# Patient Record
Sex: MALE | Race: BLACK OR AFRICAN AMERICAN | URBAN - METROPOLITAN AREA
[De-identification: names, ages, dates, MRNs, and addresses within clinical notes are randomized per-mention and may not be internally consistent; named-entity substitution may affect disease eponyms.]

---

## 2017-01-03 ENCOUNTER — TELEPHONE (OUTPATIENT)
Dept: ORTHOPEDICS | Facility: CLINIC | Age: 62
End: 2017-01-03

## 2017-01-03 NOTE — TELEPHONE ENCOUNTER
Patient calling for return to work clarification. He states he is ready to go back to work 1/9/17. Letter written and patient notified.

## 2017-01-03 NOTE — TELEPHONE ENCOUNTER
----- Message from Cindy Vogt sent at 1/3/2017 11:10 AM CST -----  Regarding: Pt needs clearance to return to work from Dr. Watts  Contact: [phone]  Pt calling about clarification on when he can return to work.  Pt was informed by his work that he will need clearance from Dr. Watts before he can return to work.  Please call the pt back with the number listed above.    Thank you,  Cindy PEÑA  Call Center    Please DO NOT send this message and/or reply back to sender.  Call Center Representatives DO NOT respond to messages.

## 2017-01-03 NOTE — Clinical Note
Return to Work  January 3, 2017     Seen today: no    Patient:  Justus Guido  :   1955  MRN:     [id number]  Physician: KIERAN ANGELO SHYANN Guido may return to work on Date: 17.      The next clinic appointment is scheduled for (date/time) 17.    Patient limitations:  Seated work only until re-evaluated in clinic 17           Electronically signed by Kieran Angelo MD

## 2017-01-17 ENCOUNTER — OFFICE VISIT (OUTPATIENT)
Dept: ORTHOPEDICS | Facility: CLINIC | Age: 62
End: 2017-01-17

## 2017-01-17 VITALS — HEIGHT: 73 IN | BODY MASS INDEX: 29.55 KG/M2 | WEIGHT: 223 LBS

## 2017-01-17 DIAGNOSIS — M25.571 PAIN IN JOINT, ANKLE AND FOOT, RIGHT: Primary | ICD-10-CM

## 2017-01-17 NOTE — NURSING NOTE
"Reason For Visit:   Chief Complaint   Patient presents with     Surgical Followup     S/P R Achilles lengthening and ST and TN fusion. DOS 12/2/16.       Pain Assessment  Patient Currently in Pain: No               HEIGHT: 6' 1\", WEIGHT: 223 lbs 0 oz, BMI: Body mass index is 29.43 kg/(m^2).      Current Outpatient Prescriptions   Medication Sig Dispense Refill     Multiple Vitamins-Minerals (MULTIVITAMIN ADULT PO)        Ferrous Sulfate (IRON SUPPLEMENT PO)           No Known Allergies        "

## 2017-01-17 NOTE — MR AVS SNAPSHOT
After Visit Summary   2017    Justus Guido    MRN: [id number]           Patient Information     Date Of Birth          1955        Visit Information        Provider Department      2017 1:00 PM Kieran Watts MD [facility]        Today's Diagnoses     Pain in joint, ankle and foot, right    -  1        Follow-ups after your visit        Your next 10 appointments already scheduled     2017  2:00 PM   (Arrive by 1:45 PM)   RETURN FOOT/ANKLE with Kieran Watts MD   [facility] ([facility] [place])    [address] [place] [id number] [phone]              Who to contact     Please call your clinic at [phone] to:    Ask questions about your health    Make or cancel appointments    Discuss your medicines    Learn about your test results    Speak to your doctor   If you have compliments or concerns about an experience at your clinic, or if you wish to file a complaint, please contact [facility] Patient Relations at [phone] or email us at [email or web address]         Additional Information About Your Visit        MyChart Information     Amakemt is an electronic gateway that provides easy, online access to your medical records. With Derceto, you can request a clinic appointment, read your test results, renew a prescription or communicate with your care team.     To sign up for Amakemt visit the website at [email or web address]   You will be asked to enter the access code listed below, as well as some personal information. Please follow the directions to create your username and password.     Your access code is: [email or web address]  Expires: 2017  6:31 AM     Your access code will  in 90 days. If you need help or a new code, please contact your [facility] or call [phone] for assistance.       "  Care EveryWhere ID     This is your Care EveryWhere ID. This could be used by other organizations to access your [place] medical records  [id number]        Your Vitals Were     Height BMI (Body Mass Index)                1.854 m (6' 1\") 29.43 kg/m2           Blood Pressure from Last 3 Encounters:   11/23/16 133/87    Weight from Last 3 Encounters:   01/17/17 101.152 kg (223 lb)   11/23/16 101.3 kg (223 lb 5.2 oz)   08/09/16 100.245 kg (221 lb)              Today, you had the following     No orders found for display       Primary Care Provider    Eden Santos RN       No address on file        Thank you!     Thank you for choosing [facility]  for your care. Our goal is always to provide you with excellent care. Hearing back from our patients is one way we can continue to improve our services. Please take a few minutes to complete the written survey that you may receive in the mail after your visit with us. Thank you!             Your Updated Medication List - Protect others around you: Learn how to safely use, store and throw away your medicines at www.disposemymeds.org.          This list is accurate as of: 1/17/17  2:16 PM.  Always use your most recent med list.                   Brand Name Dispense Instructions for use    IRON SUPPLEMENT PO          MULTIVITAMIN ADULT PO            "

## 2017-01-17 NOTE — Clinical Note
1/17/2017       RE: Justus Guido  [address]     [place] [address]     Dear Colleague,    Thank you for referring your patient, Justus Guido, to the [facility] at [facility]. Please see a copy of my visit note below.    CHIEF COMPLAINT:  Status post right Achilles tendon lengthening with subtalar and talonavicular joint arthrodesis performed on 12/02/2016.      HISTORY OF PRESENT ILLNESS:  Mr. Guido presents today for further followup.  Denies to have any problems or complaints.  Reports to be compliant with our activity restrictions, although reports to have fallen approximately 3 weeks ago.      PHYSICAL EXAMINATION:  On today's visit, he presents with well-healed surgical incisions.  Alignment of the foot is excellent.  There is some diffuse swelling.      RADIOGRAPHIC EVALUATION:  Three views of the right foot were obtained today which were significant for showing partial consolidation across the talonavicular and subtalar joints.  Alignment is excellent.  Hardware is intact and in place.      ASSESSMENT:  Status post right subtalar and talonavicular joint arthrodesis.      PLAN:  I discussed with the patient that he is making excellent progress.  The patient will proceed with nonweightbearing for another 6 weeks.  He will return to clinic at that time and 3 views of the right foot will be obtained.      On today's visit, he was given a CAM Walker to be utilized at all times except for hygiene, resting, sitting and sleeping activities.      No physical therapy will be started yet.   Kieran Watts MD

## 2017-01-17 NOTE — PROGRESS NOTES
CHIEF COMPLAINT:  Status post right Achilles tendon lengthening with subtalar and talonavicular joint arthrodesis performed on 12/02/2016.      HISTORY OF PRESENT ILLNESS:  Mr. Guiod presents today for further followup.  Denies to have any problems or complaints.  Reports to be compliant with our activity restrictions, although reports to have fallen approximately 3 weeks ago.      PHYSICAL EXAMINATION:  On today's visit, he presents with well-healed surgical incisions.  Alignment of the foot is excellent.  There is some diffuse swelling.      RADIOGRAPHIC EVALUATION:  Three views of the right foot were obtained today which were significant for showing partial consolidation across the talonavicular and subtalar joints.  Alignment is excellent.  Hardware is intact and in place.      ASSESSMENT:  Status post right subtalar and talonavicular joint arthrodesis.      PLAN:  I discussed with the patient that he is making excellent progress.  The patient will proceed with nonweightbearing for another 6 weeks.  He will return to clinic at that time and 3 views of the right foot will be obtained.      On today's visit, he was given a CAM Walker to be utilized at all times except for hygiene, resting, sitting and sleeping activities.      No physical therapy will be started yet.

## 2017-02-14 ENCOUNTER — MEDICAL CORRESPONDENCE (OUTPATIENT)
Dept: HEALTH INFORMATION MANAGEMENT | Facility: CLINIC | Age: 62
End: 2017-02-14

## 2017-02-20 DIAGNOSIS — M25.571 PAIN IN JOINT, ANKLE AND FOOT, RIGHT: Primary | ICD-10-CM

## 2017-02-28 ENCOUNTER — OFFICE VISIT (OUTPATIENT)
Dept: ORTHOPEDICS | Facility: CLINIC | Age: 62
End: 2017-02-28

## 2017-02-28 DIAGNOSIS — M25.571 PAIN IN JOINT, ANKLE AND FOOT, RIGHT: Primary | ICD-10-CM

## 2017-02-28 NOTE — NURSING NOTE
Reason For Visit:   Chief Complaint   Patient presents with     RECHECK     right Achilles tendon lengthening with subtalar and talonavicular joint arthrodesis. DOS 12/2/16.           Pain Assessment  Patient Currently in Pain: No

## 2017-02-28 NOTE — MR AVS SNAPSHOT
After Visit Summary   2017    Justus Guido    MRN: [id number]           Patient Information     Date Of Birth          1955        Visit Information        Provider Department      2017 12:50 PM Kieran Watts MD [facility]        Today's Diagnoses     Pain in joint, ankle and foot, right    -  1       Follow-ups after your visit        Your next 10 appointments already scheduled     Mar 28, 2017  2:00 PM CDT   (Arrive by 1:45 PM)   RETURN FOOT/ANKLE with Kieran Watts MD   [facility] ([facility] [place])    [address] [place] [id number] [phone]              Who to contact     Please call your clinic at [phone] to:    Ask questions about your health    Make or cancel appointments    Discuss your medicines    Learn about your test results    Speak to your doctor   If you have compliments or concerns about an experience at your clinic, or if you wish to file a complaint, please contact [facility] Patient Relations at [phone] or email us at [email or web address]         Additional Information About Your Visit        MyChart Information     Rock'n Rovert is an electronic gateway that provides easy, online access to your medical records. With "[employer] you can request a clinic appointment, read your test results, renew a prescription or communicate with your care team.     To sign up for Rock'n Rovert visit the website at [email or web address]   You will be asked to enter the access code listed below, as well as some personal information. Please follow the directions to create your username and password.     Your access code is: [email or web address]  Expires: 2017  6:31 AM     Your access code will  in 90 days. If you need help or a new code, please contact your [facility] or call [phone] for assistance.         Care EveryWhere ID     This is your Care EveryWhere ID. This could be used by other organizations to access your [place] medical records  [id number]         Blood Pressure from Last 3 Encounters:   No data found for BP    Weight from Last 3 Encounters:   No data found for Wt              Today, you had the following     No orders found for display         Today's Medication Changes          These changes are accurate as of: 2/28/17 11:59 PM.  If you have any questions, ask your nurse or doctor.               Stop taking these medicines if you haven't already. Please contact your care team if you have questions.     IRON SUPPLEMENT PO   Stopped by:  Kieran Watts MD                    Primary Care Provider    Eden Santos RN       No address on file        Thank you!     Thank you for choosing [facility]  for your care. Our goal is always to provide you with excellent care. Hearing back from our patients is one way we can continue to improve our services. Please take a few minutes to complete the written survey that you may receive in the mail after your visit with us. Thank you!             Your Updated Medication List - Protect others around you: Learn how to safely use, store and throw away your medicines at www.disposemymeds.org.          This list is accurate as of: 2/28/17 11:59 PM.  Always use your most recent med list.                   Brand Name Dispense Instructions for use    MULTIVITAMIN ADULT PO

## 2017-02-28 NOTE — PROGRESS NOTES
CHIEF COMPLAINT:  Status post right Achilles tendon lengthening with a talonavicular and subtalar joint arthrodesis performed on 12/02/2016.      HISTORY OF PRESENT ILLNESS:  Mr. Guido presents today for further followup.  The patient was instructed to remain nonweightbearing since last appointment and today he presents with a CAM Boot.  Reports to be nonweightbearing at work but to be walking around the house.  Denies to have any pain.      PHYSICAL EXAMINATION:  On today's exam, he presents with some diffuse swelling of the foot.  Presented with a range of motion of the ankle which is within normal limits.  Alignment of the foot is excellent.      RADIOGRAPHIC EVALUATION:  Three views of the foot were reviewed today which were significant for showing what seems to be partial to full consolidation across the arthrodesis site.  Hardware is intact and in place.  Alignment is excellent.      ASSESSMENT:  Status post right foot posterior tibialis reconstruction, stage III.      PLAN:  I discussed with the patient that at this point I would like to proceed with a CT scan as a way to better understand the condition of his arthrodesis.      I encouraged him not to do any walking until he is notified by us via phone.  I repeated that message on multiple occasions to make sure that he would understand it this time.      The patient will follow up accordingly.  Regardless of the CT scan results, he will return to clinic in a month from today with plain x-rays of the foot.

## 2017-03-07 ENCOUNTER — TELEPHONE (OUTPATIENT)
Dept: ORTHOPEDICS | Facility: CLINIC | Age: 62
End: 2017-03-07

## 2017-03-07 NOTE — TELEPHONE ENCOUNTER
Patient notified that Dr. Watts reviewed his CT scan and stated that it showed he has healed 1 joint and is still healing the other one. He is instructed to begin WBAT in the cam walker boot at all times.  He is told that he is NOT to push through the pain. He verbalized understanding of this.  He will follow up as previously scheduled.

## 2017-03-20 DIAGNOSIS — M25.571 PAIN IN JOINT, ANKLE AND FOOT, RIGHT: Primary | ICD-10-CM

## 2017-09-30 NOTE — LETTER
2/28/2017       RE: Justus Guido  [address]     [place] [address]     Dear Colleague,    Thank you for referring your patient, Justus Guido, to the [facility] at [facility]. Please see a copy of my visit note below.    CHIEF COMPLAINT:  Status post right Achilles tendon lengthening with a talonavicular and subtalar joint arthrodesis performed on 12/02/2016.      HISTORY OF PRESENT ILLNESS:  Mr. Guido presents today for further followup.  The patient was instructed to remain nonweightbearing since last appointment and today he presents with a CAM Boot.  Reports to be nonweightbearing at work but to be walking around the house.  Denies to have any pain.      PHYSICAL EXAMINATION:  On today's exam, he presents with some diffuse swelling of the foot.  Presented with a range of motion of the ankle which is within normal limits.  Alignment of the foot is excellent.      RADIOGRAPHIC EVALUATION:  Three views of the foot were reviewed today which were significant for showing what seems to be partial to full consolidation across the arthrodesis site.  Hardware is intact and in place.  Alignment is excellent.      ASSESSMENT:  Status post right foot posterior tibialis reconstruction, stage III.      PLAN:  I discussed with the patient that at this point I would like to proceed with a CT scan as a way to better understand the condition of his arthrodesis.      I encouraged him not to do any walking until he is notified by us via phone.  I repeated that message on multiple occasions to make sure that he would understand it this time.      The patient will follow up accordingly.  Regardless of the CT scan results, he will return to clinic in a month from today with plain x-rays of the foot.         Again, thank you for allowing me to participate in the care of your patient.      Sincerely,    Kieran Watts MD       pregnancy

## 2020-01-01 ENCOUNTER — HOSPITAL ENCOUNTER (EMERGENCY)
Facility: CLINIC | Age: 65
End: 2020-07-11
Attending: EMERGENCY MEDICINE | Admitting: EMERGENCY MEDICINE

## 2020-01-01 DIAGNOSIS — I46.9 CARDIAC ARREST (H): ICD-10-CM

## 2020-01-01 PROCEDURE — 25000125 ZZHC RX 250

## 2020-01-01 PROCEDURE — 99283 EMERGENCY DEPT VISIT LOW MDM: CPT

## 2020-01-01 PROCEDURE — 25000128 H RX IP 250 OP 636

## 2020-01-01 RX ORDER — EPINEPHRINE IN SOD CHLOR,ISO 1 MG/10 ML
1 SYRINGE (ML) INTRAVENOUS ONCE
Status: DISCONTINUED | OUTPATIENT
Start: 2020-01-01 | End: 2020-01-01

## 2020-07-11 NOTE — ED NOTES
Bed: [facility]  Expected date: 7/11/20  Expected time: 1:29 PM  Means of arrival: Ambulance  Comments:  449 62m cardiac arrest ETA 1338

## 2020-07-11 NOTE — ED PROVIDER NOTES
History     Chief Complaint:  Cardiac Arrest      HPI   Justus Guido is a 65 year old male with history of amyloidosis, chronic kidney disease, hepatitis C who was brought in by ambulance in cardiac arrest.  The patient was found by  between his toilet and tub in the bathroom.  There is some blood in the toilet and stool.   did not perform CPR, and it was 6 minutes before paramedics arrived.  Upon arrival of the paramedics, the patient was in asystole.  He received a total of 4 mg of epinephrine, and had about a minute and a half of return of spontaneous circulation.  After that, he again went into asystole.  Total resuscitation time was about 45 minutes.  Transfer time was about 15 minutes.  With paramedics, the patient had no purposeful movements, and pupils were fixed and dilated.    Allergies:    No Known Allergies     Medications:    Multiple Vitamins-Minerals (MULTIVITAMIN ADULT PO)      Past Medical History:    Amyloidosis  Chronic kidney disease  Hepatitis C    Past Surgical History:    No past surgical history on file.    Family History:    No family history on file.    Social History:  Marital Status:  ,   Review of Systems   Unable to perform ROS: Intubated       Physical Exam   First Vitals:     Physical Exam  Head: Abrasion to the eyebrows and the bridge of the nose.  Eyes: Pupils fixed at around 3 mm, no corneal reflex.  Ears:  no external auditory canal discharge or bleeding  Nose: no rhinorrhea or bleeding noted  Mouth: Intubated with rescue device.  Bleeding in the tube and around the tube.  Cardiac: No cardiac activity on ultrasound.  Pulmonary: Ventilated, chest rise with ventilations.  Abdomen: Abdomen is distended  Extremities: Bilateral lower extremities with edema.  Intraosseous in the left tibia.  Superficial abrasion to the right lower extremity.  Neurologic: Patient unresponsive, no spontaneous movements.  No cranial reflexes.  Pupils  fixed and dilated.      Emergency Department Course   Imaging:  [facility] Procedure Note      Limited Bedside ED Cardiac Ultrasound:     PROCEDURE: PERFORMED BY: Dr. Ana Maria Garcia MD  INDICATIONS/SYMPTOM:  Pulseless on exam/Cardiac arrest   PROBE: Low frequency convex probe  BODY LOCATION: Chest  FINDINGS:   The ultrasound was performed utilizing the subcostal views.  Cardiac contractility:  Absent   INTERPRETATION:   No cardiac activity        Interventions:  none    Emergency Department Course:  3:15 PM  We had difficulty contacting family given the incorrect numbers in the computer.  Ultimately, we were able to contact Liseth Guido, who is the patient's sister-in-law and his his local emergency contact.  She was able to give us the patient's daughter's phone number, name Vannesa Guido.  I called her, and informed her of the patient's death.  She states that the patient had had discussion with the family about the possibility of his dying secondary to chronic underlying medical conditions, and had already made arrangements for what to do after his death.  She seems to be coping well and appreciated being informed.       Impression & Plan      Medical Decision Making:  Justus Guido is a 65-year-old male with history of amyloidosis, chronic kidney disease, hepatitis C who was brought in by ambulance in cardiac arrest.  The patient had already undergone prolonged attempted resuscitation, but continued to be in asystole without any of cardiac activity on bedside ultrasound.  Pupils were fixed and dilated, the patient had no purposeful movements since EMS arrived.  Unfortunately, the likelihood of return of spontaneous circulation in this situation is extraordinarily low, and the likelihood of neurologic recovery is is 0.  Time of death was called at 1:40 PM.  Next of kin was notified from the emergency department.      Diagnosis:    ICD-10-CM    1. Cardiac arrest (H)  I46.9         Disposition:      Ana Maria Garcia MD  2020    EMERGENCY DEPARTMENT       Ana Maria Garcia MD  [phone]

## 2020-07-11 NOTE — ED TRIAGE NOTES
Found down in apartment between toilet and bathtub. Bloody stools at seen. Found w/o pulse. After 10mins of CPR and 2 rounds of epi when ROSC achieved for approx 1.5mins. Received 2 more rounds of epi, remains without pulse.

## 2020-07-12 NOTE — ED NOTES
Chart accessed trying to determine Next of Kin and/or emergency contact for patient. A female subject by the name Renate Guido called, stating she is reportedly the patients wife and should be listed as primary contact. Attempting to contact patients Sister In Law, Liseth Guido,  for clarification, as she is listed as contact on the Death Record.